# Patient Record
Sex: FEMALE | Race: AMERICAN INDIAN OR ALASKA NATIVE
[De-identification: names, ages, dates, MRNs, and addresses within clinical notes are randomized per-mention and may not be internally consistent; named-entity substitution may affect disease eponyms.]

---

## 2018-07-15 ENCOUNTER — HOSPITAL ENCOUNTER (EMERGENCY)
Dept: HOSPITAL 5 - ED | Age: 28
LOS: 1 days | Discharge: HOME | End: 2018-07-16
Payer: COMMERCIAL

## 2018-07-15 DIAGNOSIS — E86.0: ICD-10-CM

## 2018-07-15 DIAGNOSIS — M54.5: ICD-10-CM

## 2018-07-15 DIAGNOSIS — M79.1: Primary | ICD-10-CM

## 2018-07-15 DIAGNOSIS — L93.0: ICD-10-CM

## 2018-07-15 PROCEDURE — 85007 BL SMEAR W/DIFF WBC COUNT: CPT

## 2018-07-15 PROCEDURE — 85025 COMPLETE CBC W/AUTO DIFF WBC: CPT

## 2018-07-15 PROCEDURE — 96361 HYDRATE IV INFUSION ADD-ON: CPT

## 2018-07-15 PROCEDURE — 36415 COLL VENOUS BLD VENIPUNCTURE: CPT

## 2018-07-15 PROCEDURE — 99283 EMERGENCY DEPT VISIT LOW MDM: CPT

## 2018-07-15 PROCEDURE — 80053 COMPREHEN METABOLIC PANEL: CPT

## 2018-07-15 PROCEDURE — 96375 TX/PRO/DX INJ NEW DRUG ADDON: CPT

## 2018-07-15 PROCEDURE — 96374 THER/PROPH/DIAG INJ IV PUSH: CPT

## 2018-07-16 VITALS — SYSTOLIC BLOOD PRESSURE: 126 MMHG | DIASTOLIC BLOOD PRESSURE: 71 MMHG

## 2018-07-16 LAB
%HYPO/RBC NFR BLD AUTO: (no result) %
ALBUMIN SERPL-MCNC: 3.6 G/DL (ref 3.9–5)
ALT SERPL-CCNC: < 5 UNITS/L (ref 7–56)
ANISOCYTOSIS BLD QL SMEAR: (no result)
BAND NEUTROPHILS # (MANUAL): 0.2 K/MM3
BUN SERPL-MCNC: 17 MG/DL (ref 7–17)
BUN/CREAT SERPL: 24 %
CALCIUM SERPL-MCNC: 9.1 MG/DL (ref 8.4–10.2)
HCT VFR BLD CALC: 26.9 % (ref 30.3–42.9)
HEMOLYSIS INDEX: 0
HGB BLD-MCNC: 9.3 GM/DL (ref 10.1–14.3)
MCH RBC QN AUTO: 26 PG (ref 28–32)
MCHC RBC AUTO-ENTMCNC: 35 % (ref 30–34)
MCV RBC AUTO: 76 FL (ref 79–97)
MYELOCYTES # (MANUAL): 0 K/MM3
PLATELET # BLD: 502 K/MM3 (ref 140–440)
PROMYELOCYTES # (MANUAL): 0 K/MM3
RBC # BLD AUTO: 3.55 M/MM3 (ref 3.65–5.03)
TOTAL CELLS COUNTED BLD: 100

## 2018-07-16 NOTE — EMERGENCY DEPARTMENT REPORT
HPI





- General


Chief Complaint: Pain General


Time Seen by Provider: 07/16/18 03:49





- HPI


HPI: 








The patient is a 27-year-old female presents for evaluation of back and 

shoulder pain.  The patient has a history of lupus and states that she has a 

long history of experiencing shoulder pain, back pain, and other joint pain 

secondary to her lupus.  She reports constant aching pain of the upper back and 

shoulders 16 p.m. earlier tonight, 6 hours prior to my evaluation.  She states 

that her pain has been nauseous in severity, exacerbated with movement, and 

improved at rest. The patient denies blunt trauma to the back, fall, fever, 

chills, night sweats, saddle anesthesia, paresthesias, numbness or tingling in 

the legs, leg weakness, urine or bowel incontinence or retention, difficulty 

ambulating, or other focal neurological deficits. The patient also denies 

redness or swelling to the back, IV drug use, history of cancer.








ED Past Medical Hx





- Past Medical History


Hx Hypertension: No


Hx Diabetes: No


Hx Deep Vein Thrombosis: No


Hx Renal Disease: No


Hx Sickle Cell Disease: No


Hx Seizures: No


Hx Asthma: No


Hx HIV: No





- Social History


Smoking Status: Never Smoker


Substance Use Type: None





- Medications


Home Medications: 


 Home Medications











 Medication  Instructions  Recorded  Confirmed  Last Taken  Type


 


No Known Home Medications [No  12/22/14 12/22/14 Unknown History





Reported Home Medications]     


 


Cyclobenzaprine HCl [Flexeril 5 MG 5 mg PO Q8HR PRN #15 tab 07/16/18  Unknown Rx





TAB]     


 


Ibuprofen [Motrin] 800 mg PO Q8HR PRN #15 tablet 07/16/18  Unknown Rx


 


Prednisone [predniSONE 10 mg 10 mg PO .TAPER #1 tab.ds.pk 07/16/18  Unknown Rx





(6-Day Pack, 21 Tabs)]     














ED Review of Systems


ROS: 


Stated complaint: BODY PAIN


Other details as noted in HPI








Constitutional: denies: fever


ENT: denies: throat or neck pain


Respiratory: denies: cough, shortness of breath


Cardiovascular: denies: chest pain


Endocrine: denies unexplained weight loss or gain


Gastrointestinal: denies: abdominal pain, nausea


Genitourinary: denies: dysuria


Musculoskeletal: reports back and shoulder pain denies: leg swelling


Skin: denies: rash


Neurological: denies: headache


Hematological/Lymphatic: denies: easy bleeding or easy bruising  


Psych: denies sadness or hopelessness











Physical Exam





- Physical Exam


Vital Signs: 


 Vital Signs











  07/15/18 07/15/18 07/16/18





  23:14 23:30 04:07


 


Temperature 98.7 F 98.7 F 97.9 F


 


Pulse Rate 114 H 114 H 92 H


 


Respiratory 18 18 16





Rate   


 


Blood Pressure 117/68 117/68 


 


Blood Pressure   126/71





[Right]   


 


O2 Sat by Pulse 99 99 97





Oximetry   











Physical Exam: 








General: well-nourished, well-developed, no acute distress


Head: Normocephalic, atraumatic


Eyes: normal sclera


ENT: Mucous membranes are pale and dry


Neck: No neck stiffness, no cervical adenopathy


Respiratory: Breath sounds equal bilaterally, no wheezing, rales, or rhonchi


Cardio: S1 and S2 present, no murmurs, rubs, gallops, capillary refill is 

delayed


Abdomen: Normoactive bowel sounds, soft abdomen, no rigidity, no guarding or 

rebound tenderness


Chest WALL/Back: No tenderness to palpation of the chest wall, no CVA 

tenderness with percussion


Musc: Tenderness to palpation present to bilateral caudal medial trapezius, 

upper and mid thoracic paraspinal musculature, and bilateral shoulders, pain is 

elicited with passive or active range of motion of the bilateral shoulders, no 

midline cervical, thoracic, or lumbar tenderness overlying spinous process, no 

spinous step-off or obvious deformity, no pain with flexion at the hip, normal 

active range of motion at the hip intact, no spinous step-off or obvious 

deformity, ipsi-lateral and contralateral straight leg raise tests are 

negative. On extremity testing, compartments are soft and pliable, no obvious 

gross motor strength deficit, 5+ motor strength, including extension of the 

great toe bilaterally, no muscular atrophy, spasticity, fasciculations, or 

clonus, no obvious gross sensation deficit including web space between 1st and 

2nd toes, reflexes 2+ & symmetric on DTR testing at the knee and ankle joints, 

distal pulses intact.


Skin: No rash


Neuro: no facial drooping, normal speech


Psych: Normal affect








ED Course


 Vital Signs











  07/15/18 07/15/18 07/16/18





  23:14 23:30 04:07


 


Temperature 98.7 F 98.7 F 97.9 F


 


Pulse Rate 114 H 114 H 92 H


 


Respiratory 18 18 16





Rate   


 


Blood Pressure 117/68 117/68 


 


Blood Pressure   126/71





[Right]   


 


O2 Sat by Pulse 99 99 97





Oximetry   














ED Medical Decision Making





- Lab Data


Result diagrams: 


 07/15/18 23:36





 07/15/18 23:36





- Medical Decision Making








The patient was seen and examined by myself.  The patient is placed on a 

cardiac monitor and continuous pulse ox. On initial evaluation, the patient was 

found to be in no distress.  EKG was negative for findings suggestive of acute 

cardiac infarct.  The patient is given 1 L normal saline fluid bolus for 

treatment of her dehydration and tachycardia, and IV Toradol for treatment of 

her pain.  Labs and imaging are obtained.  Chest x-ray is negative for 

pneumothorax, focal consolidation, pulmonary vascular congestion, pleural 

effusion, or other obvious acute cardiopulmonary disease process.  Lab results 

were non-concerning including levels of troponin, WBC. The patient is stable 

for discharge with outpatient follow-up.  The patient is given follow-up and 

return instructions.  The patient expressed understanding and agreed with the 

plan.  The patient is discharged in stable condition.





Critical care attestation.: 


If time is entered above; I have spent that time in minutes in the direct care 

of this critically ill patient, excluding procedure time.








ED Disposition


Clinical Impression: 


 Dehydration, Myalgia





Acute bilateral low back pain


Qualifiers:


 Sciatica presence: without sciatica Qualified Code(s): M54.5 - Low back pain





Lupus


Qualifiers:


 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 

erythematosus





Disposition: DC-01 TO HOME OR SELFCARE


Is pt being admited?: No


Does the pt Need Aspirin: No


Condition: Stable


Instructions:  Arthralgia (ED), Back Pain (ED)


Prescriptions: 


Cyclobenzaprine HCl [Flexeril 5 MG TAB] 5 mg PO Q8HR PRN #15 tab


 PRN Reason: Pain


Ibuprofen [Motrin] 800 mg PO Q8HR PRN #15 tablet


 PRN Reason: Pain


Prednisone [predniSONE 10 mg (6-Day Pack, 21 Tabs)] 10 mg PO .TAPER #1 tab.ds.pk


Referrals: 


PRIMARY CARE,MD [Primary Care Provider] - 3-5 Days


Time of Disposition: 04:36